# Patient Record
Sex: FEMALE | Race: BLACK OR AFRICAN AMERICAN | NOT HISPANIC OR LATINO | URBAN - METROPOLITAN AREA
[De-identification: names, ages, dates, MRNs, and addresses within clinical notes are randomized per-mention and may not be internally consistent; named-entity substitution may affect disease eponyms.]

---

## 2017-06-16 ENCOUNTER — TELEPHONE (OUTPATIENT)
Dept: OBSTETRICS AND GYNECOLOGY | Facility: CLINIC | Age: 22
End: 2017-06-16

## 2017-06-16 DIAGNOSIS — Z36.9 ANTENATAL SCREENING ENCOUNTER: Primary | ICD-10-CM

## 2017-06-16 NOTE — TELEPHONE ENCOUNTER
----- Message from Rosalia Rodriguez sent at 6/16/2017  4:26 PM CDT -----  Contact: self  Pt. Is calling in regards to schedule initial Ob visit. pts last MP was 03/07/17 and has taken a positive pregnancy test. Pt can be reached at [phone].

## 2017-06-19 ENCOUNTER — TELEPHONE (OUTPATIENT)
Dept: OBSTETRICS AND GYNECOLOGY | Facility: CLINIC | Age: 22
End: 2017-06-19

## 2017-06-19 NOTE — TELEPHONE ENCOUNTER
----- Message from Jessica Florecne sent at 6/19/2017 12:11 PM CDT -----  Contact: self  Patient is requesting to move her appointment for Wednesday if possible in the afternoon, Patient can be reached at [phone].

## 2017-06-19 NOTE — TELEPHONE ENCOUNTER
Patient advised I have no available appointments at the requested dates or times she requested and patient was unable to accept the times offered to her. Patient was advised by the front office staff that she could be scheduled at [facility] and declined. Patient stated she will call back and hung up.

## 2019-05-30 ENCOUNTER — TELEPHONE (OUTPATIENT)
Dept: OBSTETRICS AND GYNECOLOGY | Facility: CLINIC | Age: 24
End: 2019-05-30

## 2019-05-30 NOTE — TELEPHONE ENCOUNTER
Contacted patient to schedule transfer OB appointment. Patient states that she will call back because she's undecided.

## 2019-05-30 NOTE — TELEPHONE ENCOUNTER
----- Message from Yasmin Roach sent at 5/30/2019  2:24 PM CDT -----  Contact: self  Patient is calling to schedule an ob appt. Patient is 32wks and is trying to switch from [facility] to [facility]. Patient states she has no complications. Patient can be reached at [phone].